# Patient Record
Sex: FEMALE | Race: BLACK OR AFRICAN AMERICAN | Employment: FULL TIME | URBAN - METROPOLITAN AREA
[De-identification: names, ages, dates, MRNs, and addresses within clinical notes are randomized per-mention and may not be internally consistent; named-entity substitution may affect disease eponyms.]

---

## 2021-11-09 ENCOUNTER — OFFICE VISIT (OUTPATIENT)
Dept: FAMILY MEDICINE CLINIC | Age: 56
End: 2021-11-09
Payer: COMMERCIAL

## 2021-11-09 VITALS
WEIGHT: 177.4 LBS | OXYGEN SATURATION: 98 % | TEMPERATURE: 97.1 F | HEART RATE: 81 BPM | SYSTOLIC BLOOD PRESSURE: 121 MMHG | BODY MASS INDEX: 29.56 KG/M2 | RESPIRATION RATE: 17 BRPM | HEIGHT: 65 IN | DIASTOLIC BLOOD PRESSURE: 73 MMHG

## 2021-11-09 DIAGNOSIS — Z00.00 HEALTHCARE MAINTENANCE: Primary | ICD-10-CM

## 2021-11-09 DIAGNOSIS — Z12.39 BREAST CANCER SCREENING, HIGH RISK PATIENT: ICD-10-CM

## 2021-11-09 DIAGNOSIS — Z76.0 ENCOUNTER FOR MEDICATION REFILL: ICD-10-CM

## 2021-11-09 DIAGNOSIS — E11.9 DIABETES MELLITUS, NEW ONSET (HCC): ICD-10-CM

## 2021-11-09 LAB
CREAT UR-MCNC: 70.8 MG/DL
MICROALBUMIN UR-MCNC: 1.34 MG/DL
MICROALBUMIN/CREAT UR-RTO: 19 MG/G (ref 0–30)

## 2021-11-09 PROCEDURE — 3052F HG A1C>EQUAL 8.0%<EQUAL 9.0%: CPT

## 2021-11-09 PROCEDURE — 99386 PREV VISIT NEW AGE 40-64: CPT

## 2021-11-09 RX ORDER — MELATONIN
DAILY
COMMUNITY
End: 2022-02-04

## 2021-11-09 RX ORDER — LORATADINE 10 MG/1
10 TABLET ORAL
COMMUNITY

## 2021-11-09 RX ORDER — METFORMIN HYDROCHLORIDE 500 MG/1
500 TABLET ORAL 2 TIMES DAILY WITH MEALS
Qty: 60 TABLET | Refills: 0 | Status: SHIPPED | OUTPATIENT
Start: 2021-11-09 | End: 2021-12-10

## 2021-11-09 RX ORDER — LANCETS
EACH MISCELLANEOUS
Qty: 1 EACH | Refills: 11 | Status: SHIPPED | OUTPATIENT
Start: 2021-11-09

## 2021-11-09 RX ORDER — LANOLIN ALCOHOL/MO/W.PET/CERES
CREAM (GRAM) TOPICAL
COMMUNITY
End: 2022-02-04

## 2021-11-09 RX ORDER — INSULIN PUMP SYRINGE, 3 ML
EACH MISCELLANEOUS
Qty: 1 KIT | Refills: 0 | Status: SHIPPED | OUTPATIENT
Start: 2021-11-09

## 2021-11-09 RX ORDER — METFORMIN HYDROCHLORIDE 500 MG/1
500 TABLET ORAL 2 TIMES DAILY WITH MEALS
COMMUNITY
Start: 2021-10-01 | End: 2021-11-09 | Stop reason: SDUPTHER

## 2021-11-09 NOTE — PROGRESS NOTES
Rocky Sicard is a 61 y.o. female    Chief Complaint   Patient presents with    Establish Care    Diabetes       1. Have you been to the ER, urgent care clinic since your last visit? Hospitalized since your last visit? No  2. Have you seen or consulted any other health care providers outside of the [address] [place] [address] since your last visit? Include any pap smears or colon screening.  No    Visit Vitals  /73 (BP 1 Location: Right arm, BP Patient Position: Sitting)   Pulse 81   Temp 97.1 °F (36.2 °C) (Temporal)   Resp 17   Ht 5' 5\" (1.651 m)   Wt 177 lb 6.4 oz (80.5 kg)   SpO2 98%   BMI 29.52 kg/m²       Health Maintenance Due   Topic Date Due    Hepatitis C Screening  Never done    DTaP/Tdap/Td series (1 - Tdap) Never done    Cervical cancer screen  Never done    Breast Cancer Screen Mammogram  Never done    Lipid Screen  Never done    Colorectal Cancer Screening Combo  Never done    Shingrix Vaccine Age 50> (1 of 2) Never done    Flu Vaccine (1) Never done

## 2021-11-09 NOTE — PATIENT INSTRUCTIONS
- Routine blood work obtained   - Annual mammogram ordered  - Referral to Gastroenterologist provided (see contact information below)  - Referral to Gynecologist provided (see contact information below)  - Metformin refilled for 30 days; glucose monitor/strips/lancets provided    - Metformin dosage may be adjusted pending A1C level    Gastroenterologist Referral:    Mendez Hyde MD  [facility], [address] [place] [address].  [facility] [address] [place]  [place] [address]  Office: [phone]    Avril Jovel MD  [facility]. [address]  [place] [address]  Phone: [phone]  Fax: [phone]    Gynecologist Referral:  [address].  Erleen Night, [address] [facility] [address] [facility], [address] [place] [address]  Office: [phone]  Fax: [phone]  [facility] [address] [facility] [address] [facility], [address] [facility]  Office: [phone]  Fax: [phone]

## 2021-11-09 NOTE — PROGRESS NOTES
Subjective   Gabbi Reilly is a 61 y.o. female with a PMH of breast cancer (L-sided, diagnosed in 2004 s/p chemo, radiation, partial mastectomy w/ subsequent cosmetic surgery in 2016) sickle cell trait, and newly diagnosed T2DM (on metformin) who presents to clinic today for further evaluation of her diabetes, medication refill, and routine health maintenance. # Breast Cancer:  - Previously followed at [facility] at [facility], last seen at their clinic in 2015  - Lost to follow-up due to occupation, seen by multiple oncologists and surgical oncologists between 2004 and 2014  - Last mammogram completed in 2014    # Diabetes Mellitus:  - Patient states she was seen at [facility] on 10/1 for polyuria/polydipsia. She was found to have a serum glucose level of 532 that declined to 429 after administration of IVF. Patient was subsequently discharged on Metformin BID under advisement to f/u with her PCP.  - Patient cannot recall if A1C was tested     # Health Maintenance:  - Last seen by a Dr. Minesh Alfonso (located in/at a practice in [facility]) in 2014  - Plans to see an ophthalmologist within the next 2 months    Diet: Since being diagnosed with diabetes, avoids sugary products including sodas and juices. Now counting calories, 2000 daily. Multiple servings of vegetables. Occasionally eats chicken, fish, and pork. Limited red meat intake. Exercise: Not regularly exercising, though she has a stationary bike at home. 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 Alcohol use: 1 beer once every 6 months at most  Drug use: Denies current use. COVID vaccinated (x2, moderna)    Review of Systems:  Review of Systems   Constitutional: Negative. Negative for chills and fever. HENT: Negative for voice change. Respiratory: Negative. Negative for cough and shortness of breath. Cardiovascular: Negative. Negative for chest pain and leg swelling. Gastrointestinal: Negative. Negative for abdominal pain, diarrhea, nausea and vomiting. Endocrine: Negative. Genitourinary: Negative. Negative for dysuria. Musculoskeletal: Negative. Negative for back pain. Skin: Negative for rash. Neurological: Negative. All other systems reviewed and are negative. Gyn History  PAP Smear: Previously completed every 3 years by Dr. Mahad Barnes (Gyn). However, per patient, this provider recently retired. Her last smear was > 3 years ago. Prefers referral to a new gyn for pap smear. Sexual History:  Sexually active?: Yes  Number of sexual partners: 1  Type of sexual partners: Male    Preventative care:  HIV: Never tested  Hep C screening: Never tested  HPV vaccine: Unsure if she ever received  Tdap: Unsure if she ever received  Mammogram: Patient high risk given prior h/o breast cancer. Last mammo was in 2014. Annual mammo advised -- pt amicable. Colon cancer screening: Never completed, interested in colonoscopy  Shingrix vaccine: Never received    Past Medical History  Past Medical History:   Diagnosis Date    Breast cancer ([facility] 2004    Cancer ([facility] 2004    [facility] 10/2021       Current Medications   Current Outpatient Medications on File Prior to Visit   Medication Sig Dispense Refill    cholecalciferol (Vitamin D3) (1000 Units /25 mcg) tablet Take  by mouth daily.  ferrous sulfate 325 mg (65 mg iron) tablet Take  by mouth Daily (before breakfast).  GINSENG PO Take  by mouth.  loratadine (CLARITIN) 10 mg tablet Take 10 mg by mouth.  [DISCONTINUED] metFORMIN (GLUCOPHAGE) 500 mg tablet Take 500 mg by mouth two (2) times daily (with meals). No current facility-administered medications on file prior to visit. Surgical History  History reviewed. No pertinent surgical history.     Family History   Family History   Problem Relation Age of Onset    Diabetes Mother     Hypertension Mother     Diabetes Father     Hypertension Father        Social History  Social History     Socioeconomic History    Marital status: SINGLE     Spouse name: Not on file    Number of children: Not on file    Years of education: Not on file    Highest education level: Not on file   Occupational History    Not on file   Tobacco Use    Smoking status: Never Smoker    Smokeless tobacco: Never Used   Vaping Use    Vaping Use: Never used   Substance and Sexual Activity    Alcohol use: Never    Drug use: Never    Sexual activity: Not on file   Other Topics Concern    Not on file   Social History Narrative    Not on file     Social Determinants of Health     Financial Resource Strain:     Difficulty of Paying Living Expenses: Not on file   Food Insecurity:     Worried About [address] in the Last Year: Not on file    [address] in the Last Year: Not on file   Transportation Needs:     Lack of Transportation (Medical): Not on file    Lack of Transportation (Non-Medical):  Not on file   Physical Activity:     Days of Exercise per Week: Not on file    Minutes of Exercise per Session: Not on file   Stress:     Feeling of Stress : Not on file   Social Connections:     Frequency of Communication with Friends and Family: Not on file    Frequency of Social Gatherings with Friends and Family: Not on file    Attends [employer] Services: Not on file    Active Member of Clubs or Organizations: Not on file    Attends Club or Organization Meetings: Not on file    Marital Status: Not on file   Intimate Partner Violence:     Fear of Current or Ex-Partner: Not on file    Emotionally Abused: Not on file    Physically Abused: Not on file    Sexually Abused: Not on file   Housing Stability:     Unable to Pay for Housing in the Last Year: Not on file    Number of [place] in the Last Year: Not on file    Unstable Housing in the Last Year: Not on file         Objective   Vital Signs  Visit Vitals  /73 (BP 1 Location: Right arm, BP Patient Position: Sitting)   Pulse 81   Temp 97.1 °F (36.2 °C) (Temporal)   Resp 17   Ht 5' 5\" (1.651 m)   Wt 177 lb 6.4 oz (80.5 kg)   SpO2 98%   BMI 29.52 kg/m²       PHYSICAL EXAM   General appearance - alert, well appearing, and in no distress  Eyes: EOMI, PERRL, conjunctiva pink bilaterally. Ears - bilateral TM's and external ear canals normal  Nose - normal and patent, no erythema, discharge or polyps  Mouth - mucous membranes moist, pharynx normal without lesions  Neck - supple, no significant adenopathy  Chest - clear to auscultation, no wheezes, rales or rhonchi, symmetric air entry  Heart - normal rate, regular rhythm, normal S1, S2, no murmurs, rubs, clicks or gallops  Abdomen - soft, nontender, nondistended, no masses or organomegaly  Neurological - alert, normal speech, no focal findings or movement disorder noted  Musculoskeletal - no joint tenderness, deformity or swelling  Extremities - no pedal edema noted  Skin - normal coloration and turgor, no rashes, no suspicious skin lesions noted    Assessment   Konstantin Osei is a 61 y.o. female presenting to clinic today for further evaluation of her diabetes, medication refill, and routine health maintenance.     Plan     # Healthcare Maintenance: Patient vehemently declining all appropriate immunizations (annual influenza, Td booster, Shingrix) despite lengthy discussion advising administration.   - Labs Pending: CBC, CMP, Lipid panel, A1c, urine microalbumin/Cr ratio, HIV Ab, Hep C Ab  - Referral to GYN for Pap smear provided  - Referral to GI for colonoscopy provided  - Patient plans to see her ophthalmologist within the next 2 months    # Breast cancer screening:  - Annual mammogram ordered     # Medication Refill:  - Metformin refilled    # New Onset DM:  - Glucometer, glucose strips, lancets ordered  - A1c  - Urine microalbumin/Cr ratio    I discussed the aforementioned diagnoses with the patient as well as the plan of care. All questions were answered and an AVS was provided.      I discussed this patient with Dr. Erickson Mendoza (Attending Physician)      Signed By:  Paul Novak MD    Family Medicine Resident

## 2021-11-10 LAB
ALBUMIN SERPL-MCNC: 4.2 G/DL (ref 3.5–5)
ALBUMIN/GLOB SERPL: 1.1 {RATIO} (ref 1.1–2.2)
ALP SERPL-CCNC: 69 U/L (ref 45–117)
ALT SERPL-CCNC: 30 U/L (ref 12–78)
ANION GAP SERPL CALC-SCNC: 4 MMOL/L (ref 5–15)
AST SERPL-CCNC: 22 U/L (ref 15–37)
BASOPHILS # BLD: 0.1 K/UL (ref 0–0.1)
BASOPHILS NFR BLD: 1 % (ref 0–1)
BILIRUB SERPL-MCNC: 0.3 MG/DL (ref 0.2–1)
BUN SERPL-MCNC: 12 MG/DL (ref 6–20)
BUN/CREAT SERPL: 14 (ref 12–20)
CALCIUM SERPL-MCNC: 10.8 MG/DL (ref 8.5–10.1)
CHLORIDE SERPL-SCNC: 107 MMOL/L (ref 97–108)
CHOLEST SERPL-MCNC: 226 MG/DL
CO2 SERPL-SCNC: 28 MMOL/L (ref 21–32)
CREAT SERPL-MCNC: 0.85 MG/DL (ref 0.55–1.02)
DIFFERENTIAL METHOD BLD: ABNORMAL
EOSINOPHIL # BLD: 0.1 K/UL (ref 0–0.4)
EOSINOPHIL NFR BLD: 1 % (ref 0–7)
ERYTHROCYTE [DISTWIDTH] IN BLOOD BY AUTOMATED COUNT: 13.4 % (ref 11.5–14.5)
EST. AVERAGE GLUCOSE BLD GHB EST-MCNC: 186 MG/DL
GLOBULIN SER CALC-MCNC: 3.8 G/DL (ref 2–4)
GLUCOSE SERPL-MCNC: 108 MG/DL (ref 65–100)
HBA1C MFR BLD: 8.1 % (ref 4–5.6)
HCT VFR BLD AUTO: 37.4 % (ref 35–47)
HCV AB SERPL QL IA: NONREACTIVE
HDLC SERPL-MCNC: 49 MG/DL
HDLC SERPL: 4.6 {RATIO} (ref 0–5)
HGB BLD-MCNC: 12.2 G/DL (ref 11.5–16)
HIV 1+2 AB+HIV1 P24 AG SERPL QL IA: NONREACTIVE
HIV12 RESULT COMMENT, HHIVC: NORMAL
IMM GRANULOCYTES # BLD AUTO: 0 K/UL (ref 0–0.04)
IMM GRANULOCYTES NFR BLD AUTO: 0 % (ref 0–0.5)
LDLC SERPL CALC-MCNC: 146.8 MG/DL (ref 0–100)
LYMPHOCYTES # BLD: 1.9 K/UL (ref 0.8–3.5)
LYMPHOCYTES NFR BLD: 39 % (ref 12–49)
MCH RBC QN AUTO: 30.4 PG (ref 26–34)
MCHC RBC AUTO-ENTMCNC: 32.6 G/DL (ref 30–36.5)
MCV RBC AUTO: 93.3 FL (ref 80–99)
MONOCYTES # BLD: 0.4 K/UL (ref 0–1)
MONOCYTES NFR BLD: 9 % (ref 5–13)
NEUTS SEG # BLD: 2.4 K/UL (ref 1.8–8)
NEUTS SEG NFR BLD: 50 % (ref 32–75)
NRBC # BLD: 0 K/UL (ref 0–0.01)
NRBC BLD-RTO: 0 PER 100 WBC
PLATELET # BLD AUTO: 527 K/UL (ref 150–400)
PMV BLD AUTO: 9.4 FL (ref 8.9–12.9)
POTASSIUM SERPL-SCNC: 4.1 MMOL/L (ref 3.5–5.1)
PROT SERPL-MCNC: 8 G/DL (ref 6.4–8.2)
RBC # BLD AUTO: 4.01 M/UL (ref 3.8–5.2)
SODIUM SERPL-SCNC: 139 MMOL/L (ref 136–145)
TRIGL SERPL-MCNC: 151 MG/DL (ref ?–150)
VLDLC SERPL CALC-MCNC: 30.2 MG/DL
WBC # BLD AUTO: 4.8 K/UL (ref 3.6–11)

## 2021-11-12 NOTE — PROGRESS NOTES
Screening tests negative. A1C 8.1 (no prior value to compare to). Lipid panel notable for , , and .8. ASCVD score 9.3% with recommendation to initiate moderate intensity statin. Spoke with patient on the phone (11/12 @ ~ [phone]) regarding these results and the aforementioned recommendation. Patient opting to attempt dietary modification (reducing fried foods, limiting intake of butter and refined sugars) in lieu of initiating moderate intensity statin. I informed her of the risks and advised her that she will likely still need to start a statin at some point to reduce her cardiovascular risk. She is aware and would prefer to making adjustments to her diet. All other questions/concerns addressed. She will follow up with our clinic in ~ 3 months.

## 2021-12-10 DIAGNOSIS — Z76.0 ENCOUNTER FOR MEDICATION REFILL: ICD-10-CM

## 2021-12-10 DIAGNOSIS — Z12.39 BREAST CANCER SCREENING, HIGH RISK PATIENT: ICD-10-CM

## 2021-12-10 DIAGNOSIS — E11.9 DIABETES MELLITUS, NEW ONSET (HCC): ICD-10-CM

## 2021-12-10 RX ORDER — METFORMIN HYDROCHLORIDE 500 MG/1
500 TABLET ORAL 2 TIMES DAILY WITH MEALS
Qty: 60 TABLET | Refills: 0 | Status: SHIPPED | OUTPATIENT
Start: 2021-12-10 | End: 2021-12-21 | Stop reason: SDUPTHER

## 2021-12-21 DIAGNOSIS — Z76.0 ENCOUNTER FOR MEDICATION REFILL: ICD-10-CM

## 2021-12-21 DIAGNOSIS — E11.9 DIABETES MELLITUS, NEW ONSET (HCC): ICD-10-CM

## 2021-12-22 RX ORDER — METFORMIN HYDROCHLORIDE 500 MG/1
500 TABLET ORAL 2 TIMES DAILY WITH MEALS
Qty: 60 TABLET | Refills: 0 | Status: SHIPPED | OUTPATIENT
Start: 2021-12-22 | End: 2022-01-12 | Stop reason: SDUPTHER

## 2021-12-22 NOTE — TELEPHONE ENCOUNTER
Clint ESTRADA [facility] Nurse  Subject: Refill Request     QUESTIONS   Name of Medication? metFORMIN (GLUCOPHAGE) 500 mg tablet   Patient-reported dosage and instructions? 500 mg   How many days do you have left? 0   Preferred Pharmacy? [id number]   Pharmacy phone number (if available)? [phone]   ---------------------------------------------------------------------------   --------------   CALL BACK INFO   What is the best way for the office to contact you? OK to leave message on   voicemail   Preferred Call Back Phone Number?  [phone]

## 2022-01-12 DIAGNOSIS — Z76.0 ENCOUNTER FOR MEDICATION REFILL: ICD-10-CM

## 2022-01-12 DIAGNOSIS — E11.9 DIABETES MELLITUS, NEW ONSET (HCC): ICD-10-CM

## 2022-01-12 RX ORDER — METFORMIN HYDROCHLORIDE 500 MG/1
500 TABLET ORAL 2 TIMES DAILY WITH MEALS
Qty: 60 TABLET | Refills: 0 | Status: SHIPPED | OUTPATIENT
Start: 2022-01-12 | End: 2022-02-04 | Stop reason: SDUPTHER

## 2022-02-04 ENCOUNTER — OFFICE VISIT (OUTPATIENT)
Dept: FAMILY MEDICINE CLINIC | Age: 57
End: 2022-02-04
Payer: COMMERCIAL

## 2022-02-04 VITALS
HEIGHT: 65 IN | RESPIRATION RATE: 17 BRPM | WEIGHT: 167 LBS | TEMPERATURE: 97.1 F | BODY MASS INDEX: 27.82 KG/M2 | OXYGEN SATURATION: 100 % | SYSTOLIC BLOOD PRESSURE: 128 MMHG | DIASTOLIC BLOOD PRESSURE: 76 MMHG | HEART RATE: 76 BPM

## 2022-02-04 DIAGNOSIS — Z76.0 ENCOUNTER FOR MEDICATION REFILL: ICD-10-CM

## 2022-02-04 DIAGNOSIS — E83.52 HYPERCALCEMIA: ICD-10-CM

## 2022-02-04 DIAGNOSIS — E78.5 HYPERLIPIDEMIA, UNSPECIFIED HYPERLIPIDEMIA TYPE: ICD-10-CM

## 2022-02-04 DIAGNOSIS — E11.9 DIABETES MELLITUS, NEW ONSET (HCC): ICD-10-CM

## 2022-02-04 DIAGNOSIS — E11.9 CONTROLLED TYPE 2 DIABETES MELLITUS WITHOUT COMPLICATION, WITHOUT LONG-TERM CURRENT USE OF INSULIN (HCC): Primary | ICD-10-CM

## 2022-02-04 PROCEDURE — 99214 OFFICE O/P EST MOD 30 MIN: CPT

## 2022-02-04 RX ORDER — METFORMIN HYDROCHLORIDE 500 MG/1
500 TABLET ORAL 2 TIMES DAILY WITH MEALS
Qty: 180 TABLET | Refills: 0 | Status: SHIPPED | OUTPATIENT
Start: 2022-02-04 | End: 2022-06-14

## 2022-02-04 NOTE — PROGRESS NOTES
Chief Complaint   Patient presents with    Diabetes     1. Have you been to the ER, urgent care clinic since your last visit? Hospitalized since your last visit? No    2. Have you seen or consulted any other health care providers outside of the [address] [place] [address] since your last visit? Include any pap smears or colon screening.  No

## 2022-02-04 NOTE — PROGRESS NOTES
Chief Complaint:     Chief Complaint   Patient presents with    Diabetes       Subjective:   HPI:  Juli Brumfield is a 61 y.o. female that presents for: DM management follow-up. # Diabetes Mellitus:  - Per visit from 11/9/21: Patient discharged on Metformin by [facility]. Glucometer, glucose strips, lancets ordered on clinic visit, A1C was 8.1. Did not want to initiate statin despite elevated cholesterol levels and ASCVD score 9.3%. - Patient intentionally lost 10-11 lbs in interval since last visit  - Her average fasting blood sugar levels are in the upper 80's to low 90's  - There are only two instances where her blood sugar levels have been > 130, usually due to a carb heavy meal in close proximity to testing  - Diet: Many changes -- has reduced pasta, potatoe intake, no soft drinks, occasionally consumes zero calories beverages  - Denies: fever, chills, CP, SOB, dizziness, periods of lightheadedness. COVID - Vaccinated x2 + booster ([facility])    ROS:   Review of Systems   Constitutional: Negative. HENT: Negative. Eyes: Negative. Respiratory: Negative. Cardiovascular: Negative. Gastrointestinal: Negative. Skin: Negative. Neurological: Negative. All other systems reviewed and are negative. Health Maintenance:  Health Maintenance Due   Topic Date Due    Pneumococcal 0-64 years (1 of 2 - PPSV23) Never done    Foot Exam Q1  Never done    Eye Exam Retinal or Dilated  Never done    DTaP/Tdap/Td series (1 - Tdap) Never done    Cervical cancer screen  Never done    Colorectal Cancer Screening Combo  Never done    Shingrix Vaccine Age 50> (1 of 2) Never done    Breast Cancer Screen Mammogram  12/11/2019    Flu Vaccine (1) Never done    COVID-19 Vaccine (3 - Booster for Moderna series) 10/25/2021        Past medical history, social history, and medications personally reviewed.   Past Medical History:   Diagnosis Date    Breast cancer ([facility] 2004    Cancer ([facility] 2004    Diabetes ([facility] 10/2021        Allergies personally reviewed. No Known Allergies       Objective:   Vitals reviewed. Visit Vitals  /76   Pulse 76   Temp 97.1 °F (36.2 °C) (Temporal)   Resp 17   Ht 5' 5\" (1.651 m)   Wt 167 lb (75.8 kg)   SpO2 100%   BMI 27.79 kg/m²        Physical Exam    General appearance - alert, well appearing, and in no distress,   Head - normocephalic, atrumatic  Chest - normal chest excursion, normal inspiratory and expiratory function. Clear to ausculation bilaterally. Heart - normal rate, regular rhythm, normal S1, S2, no murmurs, rubs, clicks or gallops, no extremity edema  Abdomen- benign, no organomegaly or masses  Skin-no rashes or suspicious moles  Neuro - normal speech, moves all extremities, normal gait  Psych -  normal mood, behavior, speech  Musculoskeletal - grossly normal joint and motor function    Assessment/Plan:   I personally reviewed the following Pertinent Labs/Studies:   - Encounter Notes/Labs/Imaging from 11/9/2021    Diagnoses and all orders for this visit:    1. Controlled type 2 diabetes mellitus without complication, without long-term current use of insulin (HCC)  -     CBC W/O DIFF; Future  -     METABOLIC PANEL, COMPREHENSIVE; Future  -     LIPID PANEL; Future  -     metFORMIN (GLUCOPHAGE) 500 mg tablet; Take 1 Tablet by mouth two (2) times daily (with meals) for 90 days.  -     glucose blood VI test strips (ASCENSIA AUTODISC VI, ONE TOUCH ULTRA TEST VI) strip; Please use as prescribed with the glucometer instructions (check 4 times daily, once fasted, 3 times after meals)    2. Hypercalcemia  -     METABOLIC PANEL, COMPREHENSIVE; Future  -     HEMOGLOBIN A1C WITH EAG; Future    3. Encounter for medication refill  -     metFORMIN (GLUCOPHAGE) 500 mg tablet; Take 1 Tablet by mouth two (2) times daily (with meals) for 90 days. 4. Diabetes mellitus, new onset ([facility]  -     metFORMIN (GLUCOPHAGE) 500 mg tablet;  Take 1 Tablet by mouth two (2) times daily (with meals) for 90 days.  -     glucose blood VI test strips (ASCENSIA AUTODISC VI, ONE TOUCH ULTRA TEST VI) strip; Please use as prescribed with the glucometer instructions (check 4 times daily, once fasted, 3 times after meals)    5. Hyperlipidemia, unspecified hyperlipidemia type: Discussed plan to initiate statin if lipid panel returns similar to previous. Patient amenable to adding this class of medication.   -     LIPID PANEL; Future               Follow up: Follow-up and Dispositions    · Return in about 2 weeks (around 2/18/2022) for Annual physical at 2:20 PM with Dr. Tonya Agustin. Pt was discussed with Dr Vimal Christopher (attending physician). I have reviewed pertinent labs results and other data. I have discussed the diagnosis with the patient and the intended plan as seen in the above orders. The patient has received an after-visit summary and questions were answered concerning future plans. I have discussed medication side effects and warnings with the patient as well.     Shamika Coley MD  Resident [facility] [place]  02/05/22

## 2022-02-04 NOTE — PATIENT INSTRUCTIONS
The essentials of losing weight and a diabetic lower carbohydrate diet. 1. Exercise  a. You should exercise 45- 60 minutes every day. b. This reduces the stored energy in your muscles and allows your insulin level to fall.  c. You can only lose weight when your insulin level is low. Then you burn stored energy. 2.  Fasting   a. You should fast every night from 12-14 hours. b.  Alexandro Dow are still using energy at night when you are sleeping and will burn stored energy. c.  Fasting allows you burn stored energy in your internal organs such as the liver. This allows the insulin level to drop.   d.  This allows you to start losing weight. 3.  No sugar!   a. You should try to eliminate sugar from your diet. b.  First, eliminate sweet drinks including:  sodas and andrés, sports drinks (like Gatorade or Poweraid), sweet tea and lemonade, wine (and beer), fruit juice (contains fruit sugar) and milk (contains milk sugar). c.  Eliminate sugary cereals, cookies and candies. No donuts, pastries or coffee cake. d.  Eat desserts only on special occasions:  family reunions, birthdays, anniversaries, major holidays. Eat only small desserts!   e. Start watching labels for foods that have added sugars. 4. Limit starches   a. Limit starches particularly:  bread, noodles, pasta, crackers and chips, rice, potatoes and fries. b.  Watch out for starchy vegetables:  corn and peas. c.  Women can have 30-45 grams of carbs per meal   d. Men can have 45-60 grams of carbs per meal   e. One piece of bread has 15-20 grams of carbs   f. One half cup of oatmeal, corn, rice, peas and cooked pasta have about 15 grams of carbs. 5.  Fruit-special case   a. Fruit has nutrients we need such as Vitamin C but also contains a lot of fruit sugar (fructose)   b. Fruit is not a good snack because fructose does not suppress your appetite.    c.  Fruit can cause progression of fatty liver disease which makes weight loss harder   d. Limit yourself to one serving of fruit per day and try to eat berries, small apples, oranges, mirza or grapefruit.           e.  Avoid high sugar fruits including mangos, bananas, grapes and cherries. f.  One serving of fruit is 1/2 to 3/4 of a cup.    6.  What do I eat instead?   a. Eat protein. It curbs your appetite longer than carbs do anyway. Eat meat, chicken, fish and eggs. b.  Eat healthy fats:  fish, olive oil, nuts   c. Eat more vegetables and salads. d.  Eat all of the above before you eat any carbs.   e.  Eat slowly and enjoy your food. f.  Drink more water. 7.  Snacks   a. Good snacks:  Cheese, nuts, Kind bars (minis), Protein bars, carrot sticks, celery sticks. b.  Read labels and look for snacks with low amounts of carbohydrate per serving (10 or less)   c. Try not to eat between meals. You'll lose more weight if you are not constantly putting energy into the system. 8.  Accountability   a. You have to keep yourself honest about your diet efforts. You need reminders to stick to your change in lifestyle and diet. b.  Weigh yourself daily   c.  Record your food intake either on an deb or in writing.   d.  Record your exercise. 9.  Support and emotional health. a.  Surround yourself with people that will help you and support your efforts. b. Avoid people that may sabotage your efforts or insist that they at least not undermine you.  c.  Be patient. An average patient loses only 3 pounds a month but that's 36 pounds at the end of a year. d.  Think long term. Try to keep up good exercise and diet habits. Once you get the weight off, keep it off.  e.  Pay attention to your emotions about food and your weight. Have a healthy attitude towards yourself and your body. 10.  When do I get to go back to eating the way I did before? Answer: Never. If you resume your old patterns of eating that caused your weight gain, you'll just gain the weight back. Try to make permanent changes in how you live every day. It's not easy but you can do it.

## 2022-02-05 LAB
ALBUMIN SERPL-MCNC: 4.2 G/DL (ref 3.5–5)
ALBUMIN/GLOB SERPL: 1 {RATIO} (ref 1.1–2.2)
ALP SERPL-CCNC: 80 U/L (ref 45–117)
ALT SERPL-CCNC: 24 U/L (ref 12–78)
ANION GAP SERPL CALC-SCNC: 3 MMOL/L (ref 5–15)
AST SERPL-CCNC: 19 U/L (ref 15–37)
BILIRUB SERPL-MCNC: 0.3 MG/DL (ref 0.2–1)
BUN SERPL-MCNC: 16 MG/DL (ref 6–20)
BUN/CREAT SERPL: 17 (ref 12–20)
CALCIUM SERPL-MCNC: 10.3 MG/DL (ref 8.5–10.1)
CHLORIDE SERPL-SCNC: 106 MMOL/L (ref 97–108)
CHOLEST SERPL-MCNC: 232 MG/DL
CO2 SERPL-SCNC: 29 MMOL/L (ref 21–32)
CREAT SERPL-MCNC: 0.93 MG/DL (ref 0.55–1.02)
ERYTHROCYTE [DISTWIDTH] IN BLOOD BY AUTOMATED COUNT: 13.2 % (ref 11.5–14.5)
EST. AVERAGE GLUCOSE BLD GHB EST-MCNC: 126 MG/DL
GLOBULIN SER CALC-MCNC: 4.3 G/DL (ref 2–4)
GLUCOSE SERPL-MCNC: 85 MG/DL (ref 65–100)
HBA1C MFR BLD: 6 % (ref 4–5.6)
HCT VFR BLD AUTO: 40.8 % (ref 35–47)
HDLC SERPL-MCNC: 54 MG/DL
HDLC SERPL: 4.3 {RATIO} (ref 0–5)
HGB BLD-MCNC: 12.5 G/DL (ref 11.5–16)
LDLC SERPL CALC-MCNC: 155.6 MG/DL (ref 0–100)
MCH RBC QN AUTO: 28.2 PG (ref 26–34)
MCHC RBC AUTO-ENTMCNC: 30.6 G/DL (ref 30–36.5)
MCV RBC AUTO: 91.9 FL (ref 80–99)
NRBC # BLD: 0 K/UL (ref 0–0.01)
NRBC BLD-RTO: 0 PER 100 WBC
PLATELET # BLD AUTO: 405 K/UL (ref 150–400)
PMV BLD AUTO: 9.5 FL (ref 8.9–12.9)
POTASSIUM SERPL-SCNC: 4.4 MMOL/L (ref 3.5–5.1)
PROT SERPL-MCNC: 8.5 G/DL (ref 6.4–8.2)
RBC # BLD AUTO: 4.44 M/UL (ref 3.8–5.2)
SODIUM SERPL-SCNC: 138 MMOL/L (ref 136–145)
TRIGL SERPL-MCNC: 112 MG/DL (ref ?–150)
VLDLC SERPL CALC-MCNC: 22.4 MG/DL
WBC # BLD AUTO: 4.7 K/UL (ref 3.6–11)

## 2022-02-18 ENCOUNTER — OFFICE VISIT (OUTPATIENT)
Dept: FAMILY MEDICINE CLINIC | Age: 57
End: 2022-02-18
Payer: COMMERCIAL

## 2022-02-18 VITALS
OXYGEN SATURATION: 98 % | RESPIRATION RATE: 16 BRPM | DIASTOLIC BLOOD PRESSURE: 78 MMHG | WEIGHT: 168 LBS | HEART RATE: 81 BPM | HEIGHT: 65 IN | SYSTOLIC BLOOD PRESSURE: 124 MMHG | BODY MASS INDEX: 27.99 KG/M2

## 2022-02-18 DIAGNOSIS — Z12.31 BREAST CANCER SCREENING BY MAMMOGRAM: ICD-10-CM

## 2022-02-18 DIAGNOSIS — E11.9 DIABETES MELLITUS, NEW ONSET (HCC): ICD-10-CM

## 2022-02-18 DIAGNOSIS — Z00.00 ANNUAL PHYSICAL EXAM: Primary | ICD-10-CM

## 2022-02-18 DIAGNOSIS — E78.5 HYPERLIPIDEMIA, UNSPECIFIED HYPERLIPIDEMIA TYPE: ICD-10-CM

## 2022-02-18 PROCEDURE — 99396 PREV VISIT EST AGE 40-64: CPT

## 2022-02-18 PROCEDURE — 90715 TDAP VACCINE 7 YRS/> IM: CPT

## 2022-02-18 RX ORDER — ATORVASTATIN CALCIUM 10 MG/1
10 TABLET, FILM COATED ORAL DAILY
Qty: 90 TABLET | Refills: 3 | Status: SHIPPED | OUTPATIENT
Start: 2022-02-18 | End: 2023-02-13

## 2022-02-18 NOTE — PATIENT INSTRUCTIONS
GI Specialists:    Daphnie Hicks MD  [facility], [address]  [facility]  Office: [phone]    Alex Castillo MD  [facility]  [address]  [facility]  Phone: [phone]  Fax: [phone]  ---------------------------------------------------------------------------------  Eye Specialists:    [facility] (multiple locations across [place])  [phone]    Cathi Fernandez MD  [facility] Ophthalmology  [address] [place] [address]  Phone: [phone]  Fax: [phone]    [facility] Ophthalmology  [facility] location  [address] [facility] Department of Ophthalmology  [address] [place] [address] [address]  Phone: [phone]   Fax: [phone]    [facility] location  [address] [place] [address] Department of Ophthalmology   [address]  Phone: [phone]   Fax: [phone]    Ophthalmology Clinic location  [address] [place] [address] Department of Ophthalmology  [address] [place] [address],[address]  [place] [address] [address]  Phone: [phone]  Fax: [phone]

## 2022-02-18 NOTE — PROGRESS NOTES
Subjective   Alta Baker is a 61 y.o. female with a known history of breast cancer (diagnosed in , s/p chemo, radiation, partial mastectomy w/ cosmetic surgery in 2016), T2DM (on metformin). who presents to clinic today for annual physical exam.      Diet: Many changes -- has reduced pasta, potatoe intake, no soft drinks, occasionally consumes zero calories beverages. Eats good balance of fruits, vegetables, fish, grilled chicken. Exercise: Stationary bike -- used to walk 4 miles. Not exercising as much recently. Occupation: Disability analyst     Review of Systems:  Review of Systems   Constitutional: Negative for chills and fever. HENT: Negative for voice change. Respiratory: Negative for cough and shortness of breath. Cardiovascular: Negative for chest pain. Gastrointestinal: Negative for abdominal pain, diarrhea, nausea and vomiting. Endocrine: Negative for polydipsia and polyuria. Genitourinary: Negative for dysuria. Skin: Negative for rash. All other systems reviewed and are negative. Gyn History  Age at which menses began: age 14, entered menopause at ~ 38  Any family history of gyn cancers: Maternal-Aunt w/ ovarian cancer. Paternal-Aunt w/ breast cancer. PAP Smear: Last assessed  (was previously getting checked every 3 years). Prefers to be seen / have pap smear completed by an OB/GYN. Sexual History:  Sexually active?: Yes  Number of sexual partners: 1   Type of sexual partners: Male    Preventative care: Tobacco use: Denies former or current use. Alcohol use: 1 beer once every 6 months at most  Drug use: Denies current use. PMH: Pt with history of breast cancer (diagnosed in , s/p chemo, radiation, partial mastectomy w/ cosmetic surgery in 2016).   - advised yearly mammograms (last completed in 2018)    Depression screenin   Intimate Partner Violence screening: Feels safe   HIV: NR  Hep C: NR  Tdap: \"its been years\"  COVID vaccinated: Moderna x 2 + booster    Diabetes screening:   Lab Results   Component Value Date/Time    Hemoglobin A1c 6.0 (H) 02/04/2022 02:05 PM     Colon cancer screening: Never assessed  Shingrix vaccine: Never completed. Patient prefers not to obtain this at this time. Past Medical History  Past Medical History:   Diagnosis Date    [facility]    Cancer ([facility] 2004    Diabetes ([facility] 10/2021       Current Medications   Current Outpatient Medications on File Prior to Visit   Medication Sig Dispense Refill    metFORMIN (GLUCOPHAGE) 500 mg tablet Take 1 Tablet by mouth two (2) times daily (with meals) for 90 days. 180 Tablet 0    glucose blood VI test strips (ASCENSIA AUTODISC VI, ONE TOUCH ULTRA TEST VI) strip Please use as prescribed with the glucometer instructions (check 4 times daily, once fasted, 3 times after meals) 100 Strip 3    loratadine (CLARITIN) 10 mg tablet Take 10 mg by mouth.  Blood-Glucose Meter monitoring kit Use as directed to check blood glucose 4 times a day (fasting and after meals) 1 Kit 0    lancets misc Please use as prescribed with the glucometer instructions (check 4 times daily, once fasted, 3 times after meals) 1 Each 11     No current facility-administered medications on file prior to visit. Surgical History  History reviewed. No pertinent surgical history.     Family History   Family History   Problem Relation Age of Onset    Diabetes Mother     Hypertension Mother     Diabetes Father     Hypertension Father        Social History  Social History     Socioeconomic History    Marital status: SINGLE     Spouse name: Not on file    Number of children: Not on file    Years of education: Not on file    Highest education level: Not on file   Occupational History    Not on file   Tobacco Use    Smoking status: Never Smoker    Smokeless tobacco: Never Used   Vaping Use    Vaping Use: Never used   Substance and Sexual Activity    Alcohol use: Never    Drug use: Never    Sexual activity: Not on file   Other Topics Concern    Not on file   Social History Narrative    Not on file     Social Determinants of Health     Financial Resource Strain:     Difficulty of Paying Living Expenses: Not on file   Food Insecurity:     Worried About Running Out of Food in the Last Year: Not on file    Margaux of Food in the Last Year: Not on file   Transportation Needs:     Lack of Transportation (Medical): Not on file    Lack of Transportation (Non-Medical):  Not on file   Physical Activity:     Days of Exercise per Week: Not on file    Minutes of Exercise per Session: Not on file   Stress:     Feeling of Stress : Not on file   Social Connections:     Frequency of Communication with Friends and Family: Not on file    Frequency of Social Gatherings with Friends and Family: Not on file    Attends Worship Services: Not on file    Active Member of [address] [place] [address] "[employer] or Organizations: Not on file    Attends Club or Organization Meetings: Not on file    Marital Status: Not on file   Intimate Partner Violence:     Fear of Current or Ex-Partner: Not on file    Emotionally Abused: Not on file    Physically Abused: Not on file    Sexually Abused: Not on file   Housing Stability:     Unable to Pay for Housing in the Last Year: Not on file    Number of [place] in the Last Year: Not on file    Unstable Housing in the Last Year: Not on file         Objective   Vital Signs  Visit Vitals  /78 (BP 1 Location: Right upper arm, BP Patient Position: Sitting)   Pulse 81   Resp 16   Ht 5' 5\" (1.651 m)   Wt 168 lb (76.2 kg)   SpO2 98%   BMI 27.96 kg/m²       PHYSICAL EXAM   General appearance - alert, well appearing, and in no distress  Ears - bilateral TM's and external ear canals normal  Nose - normal and patent, no erythema, discharge or polyps  Mouth - mucous membranes moist, pharynx normal without lesions  Neck - supple, no significant adenopathy, thyroid exam: thyroid is normal in size without nodules or tenderness  Chest - clear to auscultation, no wheezes, rales or rhonchi, symmetric air entry  Heart - normal rate, regular rhythm, normal S1, S2, no murmurs, rubs, clicks or gallops  Abdomen - soft, nontender, nondistended, no masses or organomegaly  Neurological - alert, oriented, normal speech, no focal findings or movement disorder noted  Musculoskeletal - no joint tenderness, deformity or swelling  Extremities - no pedal edema noted  Skin - normal coloration and turgor, no rashes, no suspicious skin lesions noted  Breast - deferred    Assessment   Alta Baker is a 61 y.o. female presenting to clinic today for routine annual exam.    Plan   Diagnoses and all orders for this visit:    1. Annual physical exam: Referrals provided to OB/GYN for pap smear and GI for colonoscopy. Offered annual flu vaccine, PNA vaccine, and shingles -- patient opting not to receive these at this time. Amenable to obtaining TDaP.   -     TETANUS, DIPHTHERIA TOXOIDS AND ACELLULAR PERTUSSIS VACCINE (TDAP), IN INDIVIDS. >=7, IM  -     [place] IMMUNIZ ADMIN,1 SINGLE/COMB VAC/TOXOID  -     REFERRAL TO OBSTETRICS AND GYNECOLOGY  -     REFERRAL TO GASTROENTEROLOGY    2. Diabetes mellitus, new onset ([facility]  -     REFERRAL TO OPHTHALMOLOGY    3. Breast cancer screening by mammogram  -     [facility] MAMMO BI SCREENING INCL CAD; Future    4. Hyperlipidemia, unspecified hyperlipidemia type: ASCVD risk 10.5% -- initiate moderate intensity statin. Additionally, advised initiating 81 mg Aspirin daily. Patient states she will purchase this on her own from her local pharmacy. -     atorvastatin (LIPITOR) 10 mg tablet; Take 1 Tablet by mouth daily for 360 days. - Discussed importance of diet and exercise. Emphasized a healthy diet avoiding sugary drinks, fried foods, fast food, and packaged foods. Encouraged to increase workout to at least 30 minutes per day of moderate aerobic exercise for more than 5 times per week.      - F/u in 3 months to reassess A1C -- may be able to deescalate metformin therapy      I discussed the aforementioned diagnoses with the patient as well as the plan of care. All questions were answered and an AVS was provided.      I discussed this patient with Dr. Masha Saez (Attending Physician)      Signed By:  Luis Dasilva MD    Family Medicine Resident

## 2022-02-18 NOTE — PROGRESS NOTES
Jesusita Branch is a 61 y.o. female    Chief Complaint   Patient presents with    Physical     annual        Health Maintenance Due   Topic Date Due    Pneumococcal 0-64 years (1 of 2 - PPSV23) Never done    Foot Exam Q1  Never done    Eye Exam Retinal or Dilated  Never done    DTaP/Tdap/Td series (1 - Tdap) Never done    Cervical cancer screen  Never done    Colorectal Cancer Screening Combo  Never done    Shingrix Vaccine Age 50> (1 of 2) Never done    Breast Cancer Screen Mammogram  12/11/2019    Flu Vaccine (1) Never done    COVID-19 Vaccine (3 - Booster for Moderna series) 10/25/2021       Visit Vitals  /78 (BP 1 Location: Right upper arm, BP Patient Position: Sitting)   Pulse 81   Resp 16   Ht 5' 5\" (1.651 m)   Wt 168 lb (76.2 kg)   SpO2 98%   BMI 27.96 kg/m²       3 most recent PHQ Screens 2/18/2022   Little interest or pleasure in doing things Not at all   Feeling down, depressed, irritable, or hopeless Not at all   Total Score PHQ 2 0       No flowsheet data found. Abuse Screening Questionnaire 2/18/2022   Do you ever feel afraid of your partner? N   Are you in a relationship with someone who physically or mentally threatens you? N   Is it safe for you to go home? Y         1. Have you been to the ER, urgent care clinic since your last visit? Hospitalized since your last visit?no    2. Have you seen or consulted any other health care providers outside of the [address] [place] [address] since your last visit? Include any pap smears or colon screening. no

## 2022-02-19 PROBLEM — E11.9 CONTROLLED TYPE 2 DIABETES MELLITUS WITHOUT COMPLICATION, WITHOUT LONG-TERM CURRENT USE OF INSULIN (HCC): Chronic | Status: ACTIVE | Noted: 2021-11-19

## 2022-03-20 PROBLEM — E11.9 CONTROLLED TYPE 2 DIABETES MELLITUS WITHOUT COMPLICATION, WITHOUT LONG-TERM CURRENT USE OF INSULIN (HCC): Status: ACTIVE | Noted: 2021-11-19

## 2022-05-04 ENCOUNTER — HOSPITAL ENCOUNTER (OUTPATIENT)
Dept: MAMMOGRAPHY | Age: 57
Discharge: HOME | End: 2022-05-04
Payer: COMMERCIAL

## 2022-05-04 DIAGNOSIS — Z12.31 BREAST CANCER SCREENING BY MAMMOGRAM: ICD-10-CM

## 2022-05-04 PROCEDURE — 77067 SCR MAMMO BI INCL CAD: CPT

## 2022-06-11 DIAGNOSIS — E11.9 DIABETES MELLITUS, NEW ONSET (HCC): ICD-10-CM

## 2022-06-11 DIAGNOSIS — E11.9 CONTROLLED TYPE 2 DIABETES MELLITUS WITHOUT COMPLICATION, WITHOUT LONG-TERM CURRENT USE OF INSULIN (HCC): ICD-10-CM

## 2022-06-11 DIAGNOSIS — Z76.0 ENCOUNTER FOR MEDICATION REFILL: ICD-10-CM

## 2022-06-14 RX ORDER — METFORMIN HYDROCHLORIDE 500 MG/1
500 TABLET ORAL 2 TIMES DAILY WITH MEALS
Qty: 180 TABLET | Refills: 0 | Status: SHIPPED | OUTPATIENT
Start: 2022-06-14 | End: 2022-09-18

## 2022-09-16 DIAGNOSIS — E11.9 DIABETES MELLITUS, NEW ONSET (HCC): ICD-10-CM

## 2022-09-16 DIAGNOSIS — E11.9 CONTROLLED TYPE 2 DIABETES MELLITUS WITHOUT COMPLICATION, WITHOUT LONG-TERM CURRENT USE OF INSULIN (HCC): ICD-10-CM

## 2022-09-16 DIAGNOSIS — Z76.0 ENCOUNTER FOR MEDICATION REFILL: ICD-10-CM

## 2022-09-18 RX ORDER — METFORMIN HYDROCHLORIDE 500 MG/1
500 TABLET ORAL 2 TIMES DAILY WITH MEALS
Qty: 180 TABLET | Refills: 0 | Status: SHIPPED | OUTPATIENT
Start: 2022-09-18 | End: 2022-12-17

## 2022-12-22 DIAGNOSIS — E11.9 CONTROLLED TYPE 2 DIABETES MELLITUS WITHOUT COMPLICATION, WITHOUT LONG-TERM CURRENT USE OF INSULIN (HCC): ICD-10-CM

## 2022-12-22 DIAGNOSIS — Z76.0 ENCOUNTER FOR MEDICATION REFILL: ICD-10-CM

## 2022-12-22 DIAGNOSIS — E11.9 DIABETES MELLITUS, NEW ONSET (HCC): ICD-10-CM

## 2022-12-22 RX ORDER — METFORMIN HYDROCHLORIDE 500 MG/1
500 TABLET ORAL 2 TIMES DAILY WITH MEALS
Qty: 180 TABLET | Refills: 0 | Status: SHIPPED | OUTPATIENT
Start: 2022-12-22 | End: 2023-03-22

## 2023-01-13 ENCOUNTER — OFFICE VISIT (OUTPATIENT)
Dept: FAMILY MEDICINE CLINIC | Age: 58
End: 2023-01-13
Payer: COMMERCIAL

## 2023-01-13 VITALS
HEART RATE: 90 BPM | OXYGEN SATURATION: 97 % | RESPIRATION RATE: 16 BRPM | BODY MASS INDEX: 28.12 KG/M2 | DIASTOLIC BLOOD PRESSURE: 70 MMHG | SYSTOLIC BLOOD PRESSURE: 117 MMHG | WEIGHT: 169 LBS

## 2023-01-13 DIAGNOSIS — Z76.0 ENCOUNTER FOR MEDICATION REFILL: ICD-10-CM

## 2023-01-13 DIAGNOSIS — E78.5 HYPERLIPIDEMIA, UNSPECIFIED HYPERLIPIDEMIA TYPE: ICD-10-CM

## 2023-01-13 DIAGNOSIS — E11.9 CONTROLLED TYPE 2 DIABETES MELLITUS WITHOUT COMPLICATION, WITHOUT LONG-TERM CURRENT USE OF INSULIN (HCC): Primary | ICD-10-CM

## 2023-01-13 RX ORDER — METFORMIN HYDROCHLORIDE 500 MG/1
500 TABLET ORAL 2 TIMES DAILY WITH MEALS
Qty: 180 TABLET | Refills: 0 | Status: SHIPPED | OUTPATIENT
Start: 2023-01-13 | End: 2023-04-13

## 2023-01-13 RX ORDER — ATORVASTATIN CALCIUM 10 MG/1
10 TABLET, FILM COATED ORAL DAILY
Qty: 90 TABLET | Refills: 3 | Status: SHIPPED | OUTPATIENT
Start: 2023-01-13 | End: 2024-01-08

## 2023-01-13 NOTE — PATIENT INSTRUCTIONS
The essentials of losing weight and a diabetic lower carbohydrate diet. Exercise  You should exercise 45- 60 minutes every day. This reduces the stored energy in your muscles and allows your insulin level to fall. You can only lose weight when your insulin level is low. Then you burn stored energy. 2.  Fasting   a. You should fast every night from 12-14 hours. b.  [employer] are still using energy at night when you are sleeping and will burn stored energy. c.  Fasting allows you burn stored energy in your internal organs such as the liver. This allows the insulin level to drop.   d.  This allows you to start losing weight. 3.  No sugar!   a. You should try to eliminate sugar from your diet. b.  First, eliminate sweet drinks including:  sodas and andrés, sports drinks (like Gatorade or Poweraid), sweet tea and lemonade, wine (and beer), fruit juice (contains fruit sugar) and milk (contains milk sugar). c.  Eliminate sugary cereals, cookies and candies. No donuts, pastries or coffee cake. d.  Eat desserts only on special occasions:  family reunions, birthdays, anniversaries, major holidays. Eat only small desserts!   e. Start watching labels for foods that have added sugars. 4. Limit starches   a. Limit starches particularly:  bread, noodles, pasta, crackers and chips, rice, potatoes and fries. b.  Watch out for starchy vegetables:  corn and peas. c.  Women can have 30-45 grams of carbs per meal   d. Men can have 45-60 grams of carbs per meal   e. One piece of bread has 15-20 grams of carbs   f. One half cup of oatmeal, corn, rice, peas and cooked pasta have about 15 grams of carbs. 5.  Fruit-special case   a. Fruit has nutrients we need such as Vitamin C but also contains a lot of fruit sugar (fructose)   b. Fruit is not a good snack because fructose does not suppress your appetite. c.  Fruit can cause progression of fatty liver disease which makes weight loss harder   d.   Limit yourself to one serving of fruit per day and try to eat berries, small apples, oranges, mirza or grapefruit.           e.  Avoid high sugar fruits including mangos, bananas, grapes and cherries. f.  One serving of fruit is 1/2 to 3/4 of a cup.    6.  What do I eat instead?   a. Eat protein. It curbs your appetite longer than carbs do anyway. Eat meat, chicken, fish and eggs. b.  Eat healthy fats:  fish, olive oil, nuts   c. Eat more vegetables and salads. d.  Eat all of the above before you eat any carbs.   e.  Eat slowly and enjoy your food. f.  Drink more water. 7.  Snacks   a. Good snacks:  Cheese, nuts, Kind bars (minis), Protein bars, carrot sticks, celery sticks. b.  Read labels and look for snacks with low amounts of carbohydrate per serving (10 or less)   c. Try not to eat between meals. You'll lose more weight if you are not constantly putting energy into the system. 8.  Accountability   a. You have to keep yourself honest about your diet efforts. You need reminders to stick to your change in lifestyle and diet. b.  Weigh yourself daily   c.  Record your food intake either on an deb or in writing.   d.  Record your exercise. 9.  Support and emotional health. a.  Surround yourself with people that will help you and support your efforts. b. Avoid people that may sabotage your efforts or insist that they at least not undermine you.  c.  Be patient. An average patient loses only 3 pounds a month but that's 36 pounds at the end of a year. d.  Think long term. Try to keep up good exercise and diet habits. Once you get the weight off, keep it off.  e.  Pay attention to your emotions about food and your weight. Have a healthy attitude towards yourself and your body. 10.  When do I get to go back to eating the way I did before? Answer: Never. If you resume your old patterns of eating that caused your weight gain, you'll just gain the weight back.      Try to make permanent changes in how you live every day. It's not easy but you can do it.

## 2023-01-13 NOTE — PROGRESS NOTES
Chief Complaint/Subjective:   HPI:  Miguel Shi is a 61 y.o. female that presents for:   Chief Complaint   Patient presents with    Diabetes     DIABETIC CARE CHECKLIST   - Patient on ASA: No  - Patient on Statin: Yes, Atorvastatin 10 mg daily  - Patient on ACEi/ARB: No  - Diet: Limits red meats, eating fish, \"loves bread\", for last 2 months eating \"whatever I want\", changed from regular sodas to sugar-free. - Checks blood sugars:   - Fastin-90's usually   - Evenin-94, but does not check regularly  - Exercise: Limited  - Blood pressure today: 117/70  - Last ophthalmology evaluation: ? Patient unsure  - Last cholesterol check: 2022 = , .6.   - ASCVD: Previously 8.9%  - Last HbA1c: 2022 = 6.0%  - Last urine microalbulmin: 2021 = ratio of 19  - Last comprehensive foot exam: Not done  - Did patient receive pneumococcal vaccine: No, offered in the past, patient continues to declines  - Does the patient have a nephrologist: No  - Does the patient have a cardiologist: No  - Seasonal influenza vaccine: No, not interested in receiving      ROS:   Review of Systems   Constitutional: Negative. Negative for chills and fever. Respiratory: Negative. Negative for cough and shortness of breath. Cardiovascular: Negative. Negative for chest pain. Gastrointestinal: Negative. Negative for abdominal pain, diarrhea, nausea and vomiting. Genitourinary:  Negative for dysuria. Endo/Heme/Allergies:  Negative for polydipsia.      Health Maintenance:  Health Maintenance Due   Topic Date Due    Pneumococcal 0-64 years (1 - PCV) Never done    Foot Exam Q1  Never done    Eye Exam Retinal or Dilated  Never done    Hepatitis B Vaccine (1 of 3 - Risk 3-dose series) Never done    Cervical cancer screen  Never done    Colorectal Cancer Screening Combo  Never done    Shingles Vaccine (1 of 2) Never done    COVID-19 Vaccine (3 - Booster for Moderna series) 2021    Flu Vaccine (1) Never done        Past medical history, social history, and medications personally reviewed. Past Medical History:   Diagnosis Date    Breast cancer [facility]) 2004    left    Cancer ([facility] 2004    Diabetes ([facility] 10/2021        Allergies personally reviewed. No Known Allergies       Objective:   Vitals reviewed. Visit Vitals  /70 (BP 1 Location: Left arm, BP Patient Position: Sitting, BP Cuff Size: Adult)   Pulse 90   Resp 16   Wt 169 lb (76.7 kg)   SpO2 97%   BMI 28.12 kg/m²        Physical Exam  General appearance - alert, well appearing, and in no distress  Chest - normal chest excursion, normal inspiratory and expiratory function. Clear to ausculation bilaterally. Heart - normal rate, regular rhythm, normal S1, S2, no murmurs, rubs, clicks or gallops, no extremity edema  Musculoskeletal - grossly normal joint and motor function. Assessment/Plan:   Paul Johnson is here for follow-up on chronic conditions. Diagnoses and all orders for this visit:    1. Controlled type 2 diabetes mellitus without complication, without long-term current use of insulin ([facility]): Last A1C was 6.0%. Fairly well controlled on Metformin 500 mg BID. She continues to make lifestyle changes, though, has been eating \"whatever. .\" she wants over the last 2 months. Will repeat blood work. Counseled patient on importance of follow-up with ophthalmology -- she will try to find a provider through her insurance. Will complete diabetic foot exam during physical. Declines other care gaps including PNA, Influenza, and Shingles vaccines. -     HEMOGLOBIN A1C WITH EAG; Future  -     MICROALBUMIN, UR, RAND W/ MICROALB/CREAT RATIO; Future  -     METABOLIC PANEL, COMPREHENSIVE; Future  -     CBC W/O DIFF; Future  -     metFORMIN (GLUCOPHAGE) 500 mg tablet; Take 1 Tablet by mouth two (2) times daily (with meals) for 90 days. 2. Hyperlipidemia, unspecified hyperlipidemia type: Not at goal per last lipid panel. Will repeat. Previously advised high intensity statin -- however, patient declined. We found a compromise of initiating Lipitor 10 mg -- will  increasing to at least 40 mg if not at goal.  -     LIPID PANEL; Future  -     METABOLIC PANEL, COMPREHENSIVE; Future  -     CBC W/O DIFF; Future  -     atorvastatin (LIPITOR) 10 mg tablet; Take 1 Tablet by mouth daily for 360 days. 3. Encounter for medication refill  -     atorvastatin (LIPITOR) 10 mg tablet; Take 1 Tablet by mouth daily for 360 days. -     metFORMIN (GLUCOPHAGE) 500 mg tablet; Take 1 Tablet by mouth two (2) times daily (with meals) for 90 days. Follow up: Follow-up and Dispositions    Return in about 1 month (around 2/13/2023) for complete physical with Dr. Sharda Stanton, schedule on any Friday afternoon in February. Pt was discussed with Dr Gabriel Mccoy (attending physician). I have reviewed pertinent labs results and other data. I have discussed the diagnosis with the patient and the intended plan as seen in the above orders. The patient has received an after-visit summary and questions were answered concerning future plans. I have discussed medication side effects and warnings with the patient as well.     Jada Branch MD  Resident [address] [facility]  1/13/23

## 2023-01-13 NOTE — PROGRESS NOTES
Chief Complaint   Patient presents with    Diabetes     1. Have you been to the ER, urgent care clinic since your last visit? Hospitalized since your last visit? No    2. Have you seen or consulted any other health care providers outside of the [address] [place] [address] since your last visit? Include any pap smears or colon screening.  No

## 2023-01-14 LAB
ALBUMIN SERPL-MCNC: 4 G/DL (ref 3.5–5)
ALBUMIN/GLOB SERPL: 1.1 (ref 1.1–2.2)
ALP SERPL-CCNC: 75 U/L (ref 45–117)
ALT SERPL-CCNC: 20 U/L (ref 12–78)
ANION GAP SERPL CALC-SCNC: 5 MMOL/L (ref 5–15)
AST SERPL-CCNC: 19 U/L (ref 15–37)
BILIRUB SERPL-MCNC: 0.3 MG/DL (ref 0.2–1)
BUN SERPL-MCNC: 15 MG/DL (ref 6–20)
BUN/CREAT SERPL: 15 (ref 12–20)
CALCIUM SERPL-MCNC: 10.1 MG/DL (ref 8.5–10.1)
CHLORIDE SERPL-SCNC: 106 MMOL/L (ref 97–108)
CHOLEST SERPL-MCNC: 175 MG/DL
CO2 SERPL-SCNC: 30 MMOL/L (ref 21–32)
CREAT SERPL-MCNC: 0.98 MG/DL (ref 0.55–1.02)
CREAT UR-MCNC: 97.6 MG/DL
ERYTHROCYTE [DISTWIDTH] IN BLOOD BY AUTOMATED COUNT: 12.8 % (ref 11.5–14.5)
EST. AVERAGE GLUCOSE BLD GHB EST-MCNC: 108 MG/DL
GLOBULIN SER CALC-MCNC: 3.8 G/DL (ref 2–4)
GLUCOSE SERPL-MCNC: 77 MG/DL (ref 65–100)
HBA1C MFR BLD: 5.4 % (ref 4–5.6)
HCT VFR BLD AUTO: 37.3 % (ref 35–47)
HDLC SERPL-MCNC: 63 MG/DL
HDLC SERPL: 2.8 (ref 0–5)
HGB BLD-MCNC: 11.9 G/DL (ref 11.5–16)
LDLC SERPL CALC-MCNC: 89 MG/DL (ref 0–100)
MCH RBC QN AUTO: 28.7 PG (ref 26–34)
MCHC RBC AUTO-ENTMCNC: 31.9 G/DL (ref 30–36.5)
MCV RBC AUTO: 90.1 FL (ref 80–99)
MICROALBUMIN UR-MCNC: 2.17 MG/DL
MICROALBUMIN/CREAT UR-RTO: 22 MG/G (ref 0–30)
NRBC # BLD: 0 K/UL (ref 0–0.01)
NRBC BLD-RTO: 0 PER 100 WBC
PLATELET # BLD AUTO: 413 K/UL (ref 150–400)
PMV BLD AUTO: 9.3 FL (ref 8.9–12.9)
POTASSIUM SERPL-SCNC: 4.5 MMOL/L (ref 3.5–5.1)
PROT SERPL-MCNC: 7.8 G/DL (ref 6.4–8.2)
RBC # BLD AUTO: 4.14 M/UL (ref 3.8–5.2)
SODIUM SERPL-SCNC: 141 MMOL/L (ref 136–145)
TRIGL SERPL-MCNC: 115 MG/DL (ref ?–150)
VLDLC SERPL CALC-MCNC: 23 MG/DL
WBC # BLD AUTO: 5.3 K/UL (ref 3.6–11)

## 2023-01-15 NOTE — PROGRESS NOTES
- Urine Microalb/Cr ratio WNL  - A1C improved to 5.4   - Lipid panel improved with LDL of 89, still above goal with T2DM, ASCVD 6.2%. Given good response with 10 mg atorvastatin (LDL dropped from 155.6>89), may try gradual titration up to 20 mg in lieu of transitioning to high intensity level.    - Mild thrombocytosis noted, will continue to monitor

## 2023-07-19 DIAGNOSIS — E11.9 TYPE 2 DIABETES MELLITUS WITHOUT COMPLICATIONS (HCC): ICD-10-CM

## 2023-07-19 DIAGNOSIS — Z76.0 ENCOUNTER FOR ISSUE OF REPEAT PRESCRIPTION: ICD-10-CM

## 2024-02-24 DIAGNOSIS — E78.5 HYPERLIPIDEMIA, UNSPECIFIED: ICD-10-CM

## 2024-02-26 RX ORDER — ATORVASTATIN CALCIUM 10 MG/1
10 TABLET, FILM COATED ORAL DAILY
Qty: 90 TABLET | Refills: 1 | Status: SHIPPED | OUTPATIENT
Start: 2024-02-26

## 2024-02-26 NOTE — TELEPHONE ENCOUNTER
Medication Refill Request    Vicky Shook is requesting a refill of the following medication(s):   Requested Prescriptions     Pending Prescriptions Disp Refills    atorvastatin (LIPITOR) 10 MG tablet [Pharmacy Med Name: ATORVASTATIN 10 MG TABLET] 90 tablet 3     Sig: TAKE 1 TABLET BY MOUTH EVERY DAY        Listed PCP is Ali, Mohsin, MD   Last provider to prescribe medication:  01/13/2023 on [facility]  Date of Last Office Visit at [facility]: 01/13/2023 with Dr. Dickinson  FUTURE APPOINTMENT: No future appointments.    Please send refill to:    [id number] - [place], [place] - [address] - P [phone] - F [phone] [address] [facility] [address] [address]  Phone: [phone] Fax: [phone]      Please review request and approve or deny with recommendations.

## 2024-05-25 DIAGNOSIS — E11.9 TYPE 2 DIABETES MELLITUS WITHOUT COMPLICATIONS (HCC): ICD-10-CM

## 2024-05-25 DIAGNOSIS — Z76.0 ENCOUNTER FOR ISSUE OF REPEAT PRESCRIPTION: ICD-10-CM

## 2024-05-28 NOTE — TELEPHONE ENCOUNTER
Medication Refill Request    Vicky Shook is requesting a refill of the following medication(s):   Requested Prescriptions     Pending Prescriptions Disp Refills    metFORMIN (GLUCOPHAGE) 500 MG tablet [Pharmacy Med Name: METFORMIN  MG TABLET] 180 tablet 1     Sig: TAKE 1 TABLET BY MOUTH TWO (2) TIMES DAILY (WITH MEALS) FOR 90 DAYS.        Listed PCP is Ali, Mohsin, MD   Last provider to prescribe medication: Dr. Dickinson  Last Date of Medication Prescribed: 07/20/2023  Date of Last Office Visit at [facility]: 01/13/23  FUTURE APPOINTMENT: No future appointments.    Please send refill to:    [id number] - [place], [place] - [address] -  [phone] - F [phone] [address] [address]  Phone: [phone] Fax: [phone]      Please review request and approve or deny with recommendations.